# Patient Record
Sex: FEMALE | Race: BLACK OR AFRICAN AMERICAN | NOT HISPANIC OR LATINO | Employment: PART TIME | URBAN - METROPOLITAN AREA
[De-identification: names, ages, dates, MRNs, and addresses within clinical notes are randomized per-mention and may not be internally consistent; named-entity substitution may affect disease eponyms.]

---

## 2017-07-22 ENCOUNTER — HOSPITAL ENCOUNTER (EMERGENCY)
Facility: MEDICAL CENTER | Age: 27
End: 2017-07-22
Attending: EMERGENCY MEDICINE
Payer: MEDICAID

## 2017-07-22 ENCOUNTER — APPOINTMENT (OUTPATIENT)
Dept: RADIOLOGY | Facility: MEDICAL CENTER | Age: 27
End: 2017-07-22
Attending: EMERGENCY MEDICINE
Payer: MEDICAID

## 2017-07-22 VITALS
RESPIRATION RATE: 16 BRPM | DIASTOLIC BLOOD PRESSURE: 65 MMHG | BODY MASS INDEX: 42.52 KG/M2 | HEART RATE: 75 BPM | SYSTOLIC BLOOD PRESSURE: 111 MMHG | WEIGHT: 240 LBS | HEIGHT: 63 IN | OXYGEN SATURATION: 95 % | TEMPERATURE: 99 F

## 2017-07-22 DIAGNOSIS — S20.219A CHEST WALL CONTUSION, UNSPECIFIED LATERALITY, INITIAL ENCOUNTER: ICD-10-CM

## 2017-07-22 DIAGNOSIS — S80.01XA CONTUSION OF RIGHT KNEE, INITIAL ENCOUNTER: ICD-10-CM
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
## 2017-07-22 PROCEDURE — 73560 X-RAY EXAM OF KNEE 1 OR 2: CPT | Mod: RT

## 2017-07-22 PROCEDURE — 99284 EMERGENCY DEPT VISIT MOD MDM: CPT

## 2017-07-22 PROCEDURE — 305948 HCHG GREEN TRAUMA ACT PRE-NOTIFY NO CC

## 2017-07-22 PROCEDURE — 71010 DX-CHEST-LIMITED (1 VIEW): CPT

## 2017-07-22 ASSESSMENT — PAIN SCALES - GENERAL
PAINLEVEL_OUTOF10: 6
PAINLEVEL_OUTOF10: 3

## 2017-07-22 ASSESSMENT — LIFESTYLE VARIABLES: DO YOU DRINK ALCOHOL: NO

## 2017-07-22 NOTE — ED AVS SNAPSHOT
7/22/2017    Carmelita Rendon  [place] [address]    Dear Carmelita:    [facility] wants to ensure your discharge home is safe and you or your loved ones have had all of your questions answered regarding your care after you leave the hospital.    Below is a list of resources and contact information should you have any questions regarding your hospital stay, follow-up instructions, or active medical symptoms.    Questions or Concerns Regarding… Contact   Medical Questions Related to Your Discharge  (7 days a week, 8am-5pm) Contact a Nurse Care Coordinator   [phone]   Medical Questions Not Related to Your Discharge  (24 hours a day / 7 days a week)  Contact the Nurse Health Line   [phone]    Medications or Discharge Instructions Refer to your discharge packet   or contact your [facility] Primary Care Provider   [phone]   Follow-up Appointment(s) Schedule your appointment via Right Relevance   or contact Scheduling [phone]   Billing Review your statement via Right Relevance  or contact Billing [phone]   Medical Records Review your records via Right Relevance   or contact Medical Records [phone]     You may receive a telephone call within two days of discharge. This call is to make certain you understand your discharge instructions and have the opportunity to have any questions answered. You can also easily access your medical information, test results and upcoming appointments via the Right Relevance free online health management tool. You can learn more and sign up at [employer]Box/Right Relevance. For assistance setting up your Right Relevance account, please call [phone].    Once again, we want to ensure your discharge home is safe and that you have a clear understanding of any next steps in your care. If you have any questions or concerns, please do not hesitate to contact us, we are here for you. Thank you for choosing [facility] for your healthcare needs.    Sincerely,    Your [facility] Healthcare Team

## 2017-07-22 NOTE — ED AVS SNAPSHOT
amprice Access Code: [email or web address]  Expires: 8/21/2017  3:03 PM    Your email address is not on file at [facility].  Email Addresses are required for you to sign up for amprice, please contact [phone] to verify your personal information and to provide your email address prior to attempting to register for amprice.    Carmelita Chase [facility] [place] [address]    amprice  A secure, online tool to manage your health information     [facility]’s amprice® is a secure, online tool that connects you to your personalized health information from the privacy of your home -- day or night - making it very easy for you to manage your healthcare. Once the activation process is completed, you can even access your medical information using the amprice sudhakar, which is available for free in the Apple Sudhakar store or Google Play store.     To learn more about amprice, visit [email or web address]    There are two levels of access available (as shown below):   My Chart Features  [facility] Primary Care Doctor [facility]  Specialists [facility]  Urgent  Care Non-[facility] Primary Care Doctor   Email your healthcare team securely and privately 24/7 X X X    Manage appointments: schedule your next appointment; view details of past/upcoming appointments X      Request prescription refills. X      View recent personal medical records, including lab and immunizations X X X X   View health record, including health history, allergies, medications X X X X   Read reports about your outpatient visits, procedures, consult and ER notes X X X X   See your discharge summary, which is a recap of your hospital and/or ER visit that includes your diagnosis, lab results, and care plan X X  X     How to register for [employer]:  Once your e-mail address has been verified, follow the following steps to sign up for amprice.     1. Go to  [email or web address]  2. Click on the Sign Up Now box, which takes you to the New Member Sign Up page. You will  need to provide the following information:  a. Enter your Zeomatrix Access Code exactly as it appears at the top of this page. (You will not need to use this code after you’ve completed the sign-up process. If you do not sign up before the expiration date, you must request a new code.)   b. Enter your date of birth.   c. Enter your home email address.   d. Click Submit, and follow the next screen’s instructions.  3. Create a [employer] ID. This will be your Zeomatrix login ID and cannot be changed, so think of one that is secure and easy to remember.  4. Create a Zeomatrix password. You can change your password at any time.  5. Enter your Password Reset Question and Answer. This can be used at a later time if you forget your password.   6. Enter your e-mail address. This allows you to receive e-mail notifications when new information is available in Zeomatrix.  7. Click Sign Up. You can now view your health information.    For assistance activating your Zeomatrix account, call [phone]
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
## 2017-07-22 NOTE — ED NOTES
Discharge instructions given.  All questions answered.  Pt to follow-up as needed.  Pt verbalized understanding.  All belongings with pt.  Pt ambulated to lobby.
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
## 2020-12-17 ENCOUNTER — GYNECOLOGY VISIT (OUTPATIENT)
Dept: OBGYN | Facility: CLINIC | Age: 30
End: 2020-12-17
Payer: MEDICAID

## 2020-12-17 VITALS
HEIGHT: 64 IN | WEIGHT: 270.4 LBS | DIASTOLIC BLOOD PRESSURE: 80 MMHG | BODY MASS INDEX: 46.16 KG/M2 | SYSTOLIC BLOOD PRESSURE: 120 MMHG

## 2020-12-17 DIAGNOSIS — E28.2 PCOS (POLYCYSTIC OVARIAN SYNDROME): ICD-10-CM

## 2020-12-17 DIAGNOSIS — Z01.419 WELL WOMAN EXAM WITH ROUTINE GYNECOLOGICAL EXAM: Primary | ICD-10-CM

## 2020-12-17 DIAGNOSIS — L83 ACANTHOSIS NIGRICANS: ICD-10-CM

## 2020-12-17 DIAGNOSIS — N89.8 VAGINAL ODOR: ICD-10-CM

## 2020-12-17 DIAGNOSIS — E66.01 CLASS 3 SEVERE OBESITY DUE TO EXCESS CALORIES WITH BODY MASS INDEX (BMI) OF 45.0 TO 49.9 IN ADULT, UNSPECIFIED WHETHER SERIOUS COMORBIDITY PRESENT (HCC): ICD-10-CM

## 2020-12-17 PROCEDURE — 99204 OFFICE O/P NEW MOD 45 MIN: CPT | Performed by: NURSE PRACTITIONER

## 2020-12-17 NOTE — NON-PROVIDER
Patient here c/o odor   LMP= PCOS   BCM: N/a  Last pap date/result: 2017 normal due for one today   Last mammogram if applicable n/a  Phone number: [phone]  Pharmacy confirmed.

## 2020-12-17 NOTE — PROGRESS NOTES
Carmelita Rendon is a 30 y.o. y.o. female who presents for her Gynecologic Exam        HPI Comments: Pt presents for well woman exam. Pt has complaints related to PCOS symptoms, vaginal odor, and infertility. No LMP recorded. (Menstrual status: Irregular Menses).    Carmelita is here to establish care, get a pap smear done, and discuss her fertility.  She is currently sexually active with 1 male partner. She has a history of SAB about 4 years ago, denies complications, and was approx 12 weeks pregnant. Has not had any other pregnancies since then.  Last pap was in 2017 and normal. Has history of abnormal pap necessitating colposcopy, but that came back WNL.  She states she was diagnosed with PCOS a while ago, but doesn't know when or who. She is not sure of how they diagnosed it. Doesn't currently take any medication, and isn't seeing any other doctors for her health maintenance.  She states she hardly ever gets a period. Will get some spotting every now and then. Some PMS symptoms including cramping and headaches with bleeding. Is not currently keeping track.    Review of Systems   Pertinent positives documented in HPI and all other systems reviewed & are negative    All PMH, PSH, allergies, social history and FH reviewed and updated today:  Past Medical History:   Diagnosis Date   • Acid reflux      History reviewed. No pertinent surgical history.  Patient has no known allergies.  Social History     Socioeconomic History   • Marital status: Single     Spouse name: Not on file   • Number of children: Not on file   • Years of education: Not on file   • Highest education level: Not on file   Occupational History   • Not on file   Social Needs   • Financial resource strain: Not on file   • Food insecurity     Worry: Not on file     Inability: Not on file   • Transportation needs     Medical: Not on file     Non-medical: Not on file   Tobacco Use   • Smoking status: Current Every Day Smoker     Packs/day: 0.50     Types:  "Cigarettes   • Smokeless tobacco: Never Used   Substance and Sexual Activity   • Alcohol use: No   • Drug use: No   • Sexual activity: Yes     Partners: Male   Lifestyle   • Physical activity     Days per week: Not on file     Minutes per session: Not on file   • Stress: Not on file   Relationships   • Social connections     Talks on phone: Not on file     Gets together: Not on file     Attends Oriental orthodox service: Not on file     Active member of club or organization: Not on file     Attends meetings of clubs or organizations: Not on file     Relationship status: Not on file   • Intimate partner violence     Fear of current or ex partner: Not on file     Emotionally abused: Not on file     Physically abused: Not on file     Forced sexual activity: Not on file   Other Topics Concern   • Not on file   Social History Narrative   • Not on file     History reviewed. No pertinent family history.  Medications:   No current [facility]-ordered outpatient medications on file.     No current [facility]-ordered facility-administered medications on file.           Objective:   Vital measurements:  /80 (BP Location: Right arm, Patient Position: Sitting, BP Cuff Size: Adult)   Ht 1.626 m (5' 4\")   Wt 122.7 kg (270 lb 6.4 oz)   Body mass index is 46.41 kg/m². (Goal BM I>18 <25)    Physical Exam     Chaperone offered: yes    Nursing note and vitals reviewed.  Constitutional: She is oriented to person, place, and time. She appears well-developed and well-nourished. No distress.     HEENT:   Head: Normocephalic and atraumatic.   Right Ear: External ear normal.   Left Ear: External ear normal.   Nose: Nose normal.   Eyes: Conjunctivae and EOM are normal. Pupils are equal, round, and reactive to light. No scleral icterus.     Neck: Normal range of motion. Neck supple. No tracheal deviation present. No thyromegaly present.     Pulmonary/Chest: Effort normal and breath sounds normal. No respiratory distress. She has no wheezes. She has no " rales. She exhibits no tenderness.     Cardiovascular: Regular, rate and rhythm. No JVD.    Abdominal: Soft. Bowel sounds are normal. She exhibits no distension and no mass. No tenderness. She has no rebound and no guarding.     Breast:  Symmetrical, normal consistency without masses., No dimpling or skin changes, Normal nipples without discharge, no axillary lymphadenopathy, negative, Inspection negative. No nipple discharge or bleeding, No masses    Genitourinary:  Pelvic exam was performed with patient supine.  External genitalia with no abnormal pigmentation, labial fusion,rash, tenderness, lesion or injury to the labia bilaterally.  Vagina is moist with no lesions, foul discharge, erythema, tenderness or bleeding. No foreign body around the vagina or signs of injury.   Cervix exhibits no motion tenderness, no discharge and no friability.   Uterus is mid-position, not deviated, not enlarged, not fixed and not tender.  Right adnexum displays no mass, no tenderness and no fullness. Left adnexum displays no mass, no tenderness and no fullness.     Musculoskeletal: Normal range of motion. She exhibits no edema and no tenderness.     Lymphadenopathy: She has no cervical adenopathy.     Neurological: She is alert and oriented to person, place, and time. She exhibits normal muscle tone.     Skin: Skin is warm and dry. No rash noted. She is not diaphoretic. No erythema. No pallor.     Psychiatric: She has a normal mood and affect. Her behavior is normal. Judgment and thought content normal.      Assessment:     1. Well woman exam with routine gynecological exam  THINPREP PAP W/HPV AND CTNG   2. PCOS (polycystic ovarian syndrome)  TESTOSTERONE F&T FEMALES/CHILD    17-OH PROGESTERONE    PROLACTIN    TSH    FSH    ANTI-MULLERIAN HORMONE([facility])    US-PELVIC TRANSVAGINAL ONLY   3. Vaginal odor  VAGINAL PATHOGENS DNA PANEL   4. Acanthosis nigricans  REFERRAL TO FOLLOW-UP WITH PRIMARY CARE    HEMOGLOBIN A1C   5. Class 3 severe  obesity due to excess calories with body mass index (BMI) of 45.0 to 49.9 in adult, unspecified whether serious comorbidity present (HCC)  REFERRAL TO FOLLOW-UP WITH PRIMARY CARE    REFERRAL TO [facility] IMPROVEMENT PROGRAMS (HIP)    HEMOGLOBIN A1C     Discussed need to get labs and US done prior to next appt. She is to follow up with PCP and HIP as scheduled.   Discussed need to track everything- intercourse, bleeding, symptoms. She is to follow up with positive UPT ASAP, or after all testing is done.    Discussed need to work on weight loss. Discussed that often times, ovulation will return with weight loss of 10%. Encouraged good diet and exercise. Start PNV now. Avoid smoking, alcohol, and drugs.    Plan:   Pap and physical exam performed  Monthly SBE.  Counseling: breast self exam, STD prevention, use and side effects of OCPs and family planning choices  Encourage exercise and proper diet.  Mammograms starting @ age 40 annually.  See medications and orders placed in encounter report.    Follow up after testing is done or sooner PRN  Will contact with abnormal results    Gladys BOLESM, APRN

## 2020-12-18 ENCOUNTER — TELEPHONE (OUTPATIENT)
Dept: SCHEDULING | Facility: IMAGING CENTER | Age: 30
End: 2020-12-18

## 2020-12-21 DIAGNOSIS — N76.0 BV (BACTERIAL VAGINOSIS): Primary | ICD-10-CM

## 2020-12-21 DIAGNOSIS — N89.8 VAGINAL ODOR: ICD-10-CM

## 2020-12-21 DIAGNOSIS — B96.89 BV (BACTERIAL VAGINOSIS): Primary | ICD-10-CM

## 2020-12-21 RX ORDER — METRONIDAZOLE 500 MG/1
500 TABLET ORAL EVERY 12 HOURS
Qty: 14 TAB | Refills: 0 | Status: SHIPPED | OUTPATIENT
Start: 2020-12-21 | End: 2020-12-28

## 2020-12-22 ENCOUNTER — TELEPHONE (OUTPATIENT)
Dept: OBGYN | Facility: CLINIC | Age: 30
End: 2020-12-22

## 2020-12-22 NOTE — TELEPHONE ENCOUNTER
----- Message from MARCOS Graves sent at 12/21/2020 10:34 AM PST -----  +BV - rx sent to pharmacy.      Called pt and informed her test came back positive for BV. Pt informed she needs to start antibiotics. Should take the full Tx and advised to take meds with food and to avoid alcohol while on Tx. Pharmacy verified with pt. Pt agreed and verbalized understanding.

## 2020-12-30 ENCOUNTER — HOSPITAL ENCOUNTER (OUTPATIENT)
Dept: RADIOLOGY | Facility: MEDICAL CENTER | Age: 30
End: 2020-12-30
Attending: NURSE PRACTITIONER
Payer: MEDICAID

## 2020-12-30 DIAGNOSIS — E28.2 PCOS (POLYCYSTIC OVARIAN SYNDROME): ICD-10-CM

## 2020-12-30 PROCEDURE — 76830 TRANSVAGINAL US NON-OB: CPT

## 2020-12-31 DIAGNOSIS — Z01.419 WELL WOMAN EXAM WITH ROUTINE GYNECOLOGICAL EXAM: ICD-10-CM

## 2021-01-12 ENCOUNTER — TELEPHONE (OUTPATIENT)
Dept: OBGYN | Facility: CLINIC | Age: 31
End: 2021-01-12

## 2021-01-12 DIAGNOSIS — E66.01 CLASS 3 SEVERE OBESITY DUE TO EXCESS CALORIES WITH BODY MASS INDEX (BMI) OF 45.0 TO 49.9 IN ADULT, UNSPECIFIED WHETHER SERIOUS COMORBIDITY PRESENT (HCC): ICD-10-CM

## 2021-01-12 DIAGNOSIS — L83 ACANTHOSIS NIGRICANS: ICD-10-CM

## 2021-01-12 NOTE — TELEPHONE ENCOUNTER
----- Message from Gladys Johnston C.N.M. sent at 1/12/2021 10:54 AM PST -----  Follow up with MD's to review and come up with further POC     ----- Message from Gladys Johnston C.N.M. sent at 1/12/2021 10:54 AM PST -----  Please call patient, have her make an appointment with one of the physicians to review US and labs in order to further her POC. Thank you        1/12/2021 1227 Left message for pt to call back regarding US results.   1/14/2021 0910 Left message for pt to call back regarding US results.   1/15/2021 1517 Left message for pt to call back regarding US results.   1/18/2021 pt has not call back. Will send letter today to contact our office.

## 2021-01-12 NOTE — LETTER
January 18, 2021          Dear Carmelita Rendon,      Please call us regarding your care.  One of the nurses is available to speak with you Monday through Friday, 8 a.m. to 4:30 p.m.    Please call us at [phone]; thank you for your prompt attention.        Sincerely,    Windy Merritt R.N.    Electronically Signed

## 2021-01-14 ENCOUNTER — TELEPHONE (OUTPATIENT)
Dept: OBGYN | Facility: CLINIC | Age: 31
End: 2021-01-14

## 2021-01-14 NOTE — TELEPHONE ENCOUNTER
----- Message from Gladys Johnston C.N.M. sent at 1/12/2021 10:54 AM PST -----  Please call patient, have her make an appointment with one of the physicians to review US and labs in order to further her POC. Thank you    Please telephone encounter from 1/12/2021 1/14/2021 4589 Left message for pt to call back regarding lab results.

## 2021-01-28 NOTE — TELEPHONE ENCOUNTER
Pt states was sent a letter to contact our office, informed pt that we were trying to contact her to schedule an appt for the MD's to go over her U/S results and labs, pt verbalized understanding and transferred to Raquel PINTO to schedule appt.

## 2021-04-21 ENCOUNTER — OFFICE VISIT (OUTPATIENT)
Dept: MEDICAL GROUP | Facility: MEDICAL CENTER | Age: 31
End: 2021-04-21
Attending: PHYSICIAN ASSISTANT
Payer: MEDICAID

## 2021-04-21 VITALS
SYSTOLIC BLOOD PRESSURE: 110 MMHG | HEART RATE: 89 BPM | OXYGEN SATURATION: 94 % | WEIGHT: 270.6 LBS | DIASTOLIC BLOOD PRESSURE: 60 MMHG | RESPIRATION RATE: 18 BRPM | TEMPERATURE: 98 F | BODY MASS INDEX: 47.95 KG/M2 | HEIGHT: 63 IN

## 2021-04-21 DIAGNOSIS — Z71.6 ENCOUNTER FOR SMOKING CESSATION COUNSELING: ICD-10-CM

## 2021-04-21 DIAGNOSIS — G56.03 BILATERAL CARPAL TUNNEL SYNDROME: ICD-10-CM

## 2021-04-21 DIAGNOSIS — R06.02 SHORTNESS OF BREATH: ICD-10-CM

## 2021-04-21 DIAGNOSIS — F17.210 TOBACCO SMOKER, 1 PACK OF CIGARETTES OR LESS PER DAY: ICD-10-CM

## 2021-04-21 PROCEDURE — 99213 OFFICE O/P EST LOW 20 MIN: CPT | Performed by: PHYSICIAN ASSISTANT

## 2021-04-21 PROCEDURE — 99204 OFFICE O/P NEW MOD 45 MIN: CPT | Performed by: PHYSICIAN ASSISTANT

## 2021-04-21 PROCEDURE — 99203 OFFICE O/P NEW LOW 30 MIN: CPT | Performed by: PHYSICIAN ASSISTANT

## 2021-04-21 RX ORDER — VARENICLINE TARTRATE 0.5 MG/1
0.5 TABLET, FILM COATED ORAL DAILY
Qty: 11 TABLET | Refills: 0 | Status: SHIPPED | OUTPATIENT
Start: 2021-04-21 | End: 2021-05-03

## 2021-04-21 ASSESSMENT — PATIENT HEALTH QUESTIONNAIRE - PHQ9: CLINICAL INTERPRETATION OF PHQ2 SCORE: 0

## 2021-04-21 NOTE — PROGRESS NOTES
Chief Complaint   Patient presents with   • Establish Care     shortness of breath     Subjective:     HPI:   Carmelita Rendon is a 30 y.o. female here to establish care and to discuss the evaluation and management of:    Encounter for smoking cessation counseling  This is a chronic condition. She is smoking tobacco.  Years used: 10 years.   Packs/day: 1.   Previously quit/prior attempts: 6 prior attempts to stop smoking.   Duration of success: 2 months.    Method used: Has tried patches, gum, vape and chantix.  He has been more successful Chantix.  Triggers/reason for relapse: depression.   Reports dyspnea, coughing or wheezing.  Set quit date: May 5th.  Reports that she does not have a good support system.    Bilateral carpal tunnel syndrome  Carmelita presents today to establish care.  She reports a history of bilateral carpal tunnel syndrome.  She reports increased difficulty with completing tasks due to her symptoms of intermittent numbness, tingling, pain and weakness of the bilateral hands and wrists.  She reports increased difficulty with opening jars and picking up objects.  She finds that most the time she is dropping objects due to decreased .  She has trialed a combination of ibuprofen and Tylenol and wrist splinting which she reports no benefit or decrease in her symptoms.  She has not seen a specialist for this in the past    ROS  See HPI.     No Known Allergies    Current medicines (including changes today)  Current Outpatient Medications   Medication Sig Dispense Refill   • varenicline (CHANTIX) 0.5 MG tablet Take 1 tablet by mouth every day. 11 tablet 0     No current facility-administered medications for this visit.     She  has a past medical history of Acid reflux.  She  has no past surgical history on file.  Social History     Tobacco Use   • Smoking status: Current Every Day Smoker     Packs/day: 1.00     Years: 10.00     Pack years: 10.00     Types: Cigarettes   • Smokeless tobacco: Never  "Used   Substance Use Topics   • Alcohol use: No   • Drug use: No       Family History   Problem Relation Age of Onset   • Diabetes Mother    • Lung Disease Mother         emphysema    • Hypertension Father    • Cancer Neg Hx    • Heart Disease Neg Hx      Family Status   Relation Name Status   • Mo     • Fa     • Bro 2 Alive       Patient Active Problem List    Diagnosis Date Noted   • Encounter for smoking cessation counseling 2021   • Bilateral carpal tunnel syndrome 2021   • BV (bacterial vaginosis) 2020   • Acanthosis nigricans 2020   • PCOS (polycystic ovarian syndrome) 2020   • Class 3 severe obesity due to excess calories with body mass index (BMI) of 45.0 to 49.9 in adult ([facility]) 2020        Objective:     /60 (BP Location: Right arm, Patient Position: Sitting, BP Cuff Size: Adult long)   Pulse 89   Temp 36.7 °C (98 °F) (Temporal)   Resp 18   Ht 1.6 m (5' 3\")   Wt 123 kg (270 lb 9.6 oz)   SpO2 94%  Body mass index is 47.93 kg/m².    Physical Exam:  Physical Exam   Constitutional: She is oriented to person, place, and time and well-developed, well-nourished, and in no distress.   HENT:   Head: Normocephalic.   Right Ear: External ear normal.   Left Ear: External ear normal.   Eyes: Pupils are equal, round, and reactive to light.   Neck: No thyromegaly present.   Acanthosis nigricans of the neck.   Cardiovascular: Normal rate, regular rhythm and normal heart sounds.   Pulmonary/Chest: Effort normal and breath sounds normal.   Musculoskeletal:         General: Normal range of motion.      Cervical back: Normal range of motion.   Lymphadenopathy:     She has no cervical adenopathy.        Right: No supraclavicular adenopathy present.        Left: No supraclavicular adenopathy present.   Neurological: She is alert and oriented to person, place, and time. She has normal sensation and normal strength. Gait normal.   Positive Phalen's test.   Skin: Skin " is warm and dry.   Psychiatric: Affect and judgment normal.   Vitals reviewed.    Assessment and Plan:     The following treatment plan was discussed:    1. Bilateral carpal tunnel syndrome  - This is a chronic worsening condition.  - Motor and sensation are intact.  Positive Phalen's test.  - Plan: Patient has exhausted conservative treatment options and is experiencing worsening of symptoms. REFERRAL TO ORTHOPEDICS for further evaluation and treatment.    2. Encounter for smoking cessation counseling, Shortness of breath  - This is a chronic condition in which the patient is seeking treatment to quit smoking.  - Spent the majority of the time during the visit counseling the patient on smoking cessation.  - Varenicline (CHANTIX) 0.5 MG tablet; Take 1 Tab by mouth everyday, Dispense: 11 Tab; Refill: 0              - Days 1 to 3: 0.5 mg once daily              - Days 4 to 7: 0.5 mg twice daily  - Maintenance (? Day 8): 1 mg twice daily for 11 weeks; may consider a temporary or permanent dose reduction if usual         dose is not tolerated.  - Education given on medication use/regimen and side effect profile.  - We will follow-up closely on Dany within 2 weeks from set quit date and see how she is tolerating the medication and overall tobacco cessation.  - Due to new onset symptoms of dyspnea, cough and wheezing, we will obtain a chest xray and refer to Pulmonology for spirometry testing.   - REFERRAL TO PULMONARY AND SLEEP MEDICINE  - DX-CHEST-2 VIEWS; Future    Any change or worsening of signs or symptoms, patient encouraged to follow-up or report to emergency room for further evaluation. Patient verbalizes understanding and agrees.    Follow-Up: Return if symptoms worsen or fail to improve.      PLEASE NOTE: This dictation was created using voice recognition software. I have made every reasonable attempt to correct obvious errors, but I expect that there are errors of grammar and possibly content that I did not  discover before finalizing the note.

## 2021-04-21 NOTE — ASSESSMENT & PLAN NOTE
This is a chronic condition. She is smoking tobacco.  Years used: 10 years.   Packs/day: 1.   Previously quit/prior attempts: 6 prior attempts to stop smoking.   Duration of success: 2 months.    Method used: Has tried patches, gum, vape and chantix.  He has been more successful Chantix.  Triggers/reason for relapse: depression.   Reports dyspnea, coughing or wheezing.  Set quit date: May 5th.  Reports that she does not have a good support system.

## 2021-04-21 NOTE — PATIENT INSTRUCTIONS
Chantix/varenicline:   Initial:  Days 1 to 3: 0.5 mg once daily (Start 1 week before quit date: 4/28/21).  Days 4 to 7: 0.5 mg twice daily  Maintenance (? Day 8): 1 mg twice daily for 11 weeks; may consider a temporary or permanent dose reduction if usual dose is not tolerated.    Note: Start 1 week before target quit date.  If able to successfully quit smoking at the end of the 12 weeks, may continue for another 12 weeks to help maintain success.    F/u 3-7 days after scheduled quit date and at least monthly for 3 months thereafter.  PI: 1-800-QUIT-NOW for over the phone counseling.

## 2021-04-21 NOTE — ASSESSMENT & PLAN NOTE
Carmelita presents today to establish care.  She reports a history of bilateral carpal tunnel syndrome.  She reports increased difficulty with completing tasks due to her symptoms of intermittent numbness, tingling, pain and weakness of the bilateral hands and wrists.  She reports increased difficulty with opening jars and picking up objects.  She finds that most the time she is dropping objects due to decreased .  She has trialed a combination of ibuprofen and Tylenol and wrist splinting which she reports no benefit or decrease in her symptoms.  She has not seen a specialist for this in the past

## 2021-04-22 ENCOUNTER — GYNECOLOGY VISIT (OUTPATIENT)
Dept: OBGYN | Facility: CLINIC | Age: 31
End: 2021-04-22
Payer: MEDICAID

## 2021-04-22 VITALS
HEIGHT: 63 IN | DIASTOLIC BLOOD PRESSURE: 78 MMHG | WEIGHT: 270 LBS | SYSTOLIC BLOOD PRESSURE: 132 MMHG | BODY MASS INDEX: 47.84 KG/M2

## 2021-04-22 DIAGNOSIS — N85.9 LESION OF ENDOMETRIUM: ICD-10-CM

## 2021-04-22 DIAGNOSIS — N91.2 AMENORRHEA: ICD-10-CM

## 2021-04-22 DIAGNOSIS — N97.0 INFERTILITY ASSOCIATED WITH ANOVULATION: ICD-10-CM

## 2021-04-22 PROCEDURE — 99214 OFFICE O/P EST MOD 30 MIN: CPT | Performed by: OBSTETRICS & GYNECOLOGY

## 2021-04-22 NOTE — PROGRESS NOTES
Chief complaint;    Carmelita Rendon is a 30 y.o.  who presents complaining of amenorrhea patient not had a menstrual cycle in over 1 year.  Patient did conceive about 6 years ago and had a miscarriage  Patient is also having some questions about infertility  The patient had some lab tests performed including testosterone FSH hemoglobin A1c prolactin all labs normal except hemoglobin A1c 7.0  In addition, transvaginal ultrasound shows normal ovaries but small cystic lesions inside the uterine cavity.    Review of systems; denies fever chills abdominal pain, denies chest pain shortness of breath or urinary symptoms  Past medical history-  Past Medical History:   Diagnosis Date   • Acid reflux      Past surgical history-History reviewed. No pertinent surgical history.  Allergies-Patient has no known allergies.  Medications-  Current Outpatient Medications on File Prior to Visit   Medication Sig Dispense Refill   • varenicline (CHANTIX) 0.5 MG tablet Take 1 tablet by mouth every day. 11 tablet 0     No current facility-administered medications on file prior to visit.     Social history-  Social History     Socioeconomic History   • Marital status: Single     Spouse name: Not on file   • Number of children: Not on file   • Years of education: Not on file   • Highest education level: Not on file   Occupational History   • Not on file   Tobacco Use   • Smoking status: Current Every Day Smoker     Packs/day: 1.00     Years: 10.00     Pack years: 10.00     Types: Cigarettes   • Smokeless tobacco: Never Used   Substance and Sexual Activity   • Alcohol use: No   • Drug use: No   • Sexual activity: Yes     Partners: Male     Birth control/protection: None   Other Topics Concern   • Not on file   Social History Narrative   • Not on file     Social Determinants of Health     Financial Resource Strain:    • Difficulty of Paying Living Expenses:    Food Insecurity:    • Worried About Running Out of Food in the Last Year:   "  • Ran Out of Food in the Last Year:    Transportation Needs:    • Lack of Transportation (Medical):    • Lack of Transportation (Non-Medical):    Physical Activity:    • Days of Exercise per Week:    • Minutes of Exercise per Session:    Stress:    • Feeling of Stress :    Social Connections:    • Frequency of Communication with Friends and Family:    • Frequency of Social Gatherings with Friends and Family:    • Attends Scientology Services:    • Active Member of Clubs or Organizations:    • Attends Club or Organization Meetings:    • Marital Status:    Intimate Partner Violence:    • Fear of Current or Ex-Partner:    • Emotionally Abused:    • Physically Abused:    • Sexually Abused:      Past Family History-no history of breast or ovarian cancer    Physical examination;  Alert and oriented x3  General a thin well-developed well-nourished female in no apparent distress  Vitals:    04/22/21 1313   BP: 132/78   BP Location: Left arm   Patient Position: Sitting   BP Cuff Size: Adult   Weight: 122 kg (270 lb)   Height: 1.6 m (5' 3\")     Skin is warm and dry  Neck-supple  HEENT-head-atraumatic, normocephalic, EOMI, PERRLA  Cardiovascular-regular rate and rhythm, normal S1-S2, no murmurs or gallops  Lungs-clear to auscultation bilaterally  Back-negative CVA tenderness  Abdomen-nondistended positive bowel sounds soft nontender no masses or hepatosplenomegaly  Pelvic examination;  External genitalia-no visible lesions   Vagina-no blood or discharge  Cervix-no gross lesions  Uterus-normal size and shape, nontender  Adnexa without mass or tenderness  Extremities without cyanosis clubbing or edema  Neurologic exam grossly intact    Impression;  Amenorrhea-secondary to elevated BMI  Infertility-secondary to elevated BMI with oligo ovulation  Cystic endometrial lesion-May be endometrial hyperplasia  Diabetes mellitus-type II    Plan;  Needs endometrial biopsy-scheduled 6 May  If biopsy normal will either use OCPs or Provera to " induce menstrual cycles-this patient is hypoestrogenic due to elevated BMI with conversion of androstenedione to estrone and a peripheral adipose tissue.  Discussed weight loss    25  Minutes spent with the patient in face-to-face contact, greater than 50% of the time spent on counseling and coordination of care. All questions answered in detail.    Female chaperone present for entire examination and history

## 2021-04-22 NOTE — PROGRESS NOTES
Patient here for a GYN follow up.   Last seen on 12/17/2020 by Gladys Johnston  C/o u/s results today   pharmacy verified.  Patient phone #:

## 2021-05-03 DIAGNOSIS — N76.0 BV (BACTERIAL VAGINOSIS): ICD-10-CM

## 2021-05-03 DIAGNOSIS — Z71.6 ENCOUNTER FOR SMOKING CESSATION COUNSELING: ICD-10-CM

## 2021-05-03 DIAGNOSIS — B96.89 BV (BACTERIAL VAGINOSIS): ICD-10-CM

## 2021-05-03 RX ORDER — VARENICLINE TARTRATE 1 MG/1
1 TABLET, FILM COATED ORAL 2 TIMES DAILY
Qty: 30 TABLET | Refills: 5 | Status: SHIPPED | OUTPATIENT
Start: 2021-05-03 | End: 2023-01-23

## 2021-05-06 ENCOUNTER — GYNECOLOGY VISIT (OUTPATIENT)
Dept: OBGYN | Facility: CLINIC | Age: 31
End: 2021-05-06
Payer: MEDICAID

## 2021-05-06 DIAGNOSIS — N93.8 DUB (DYSFUNCTIONAL UTERINE BLEEDING): Primary | ICD-10-CM

## 2021-05-06 LAB
INT CON NEG: NORMAL
INT CON POS: NORMAL
POC URINE PREGNANCY TEST: NEGATIVE

## 2021-05-06 PROCEDURE — 81025 URINE PREGNANCY TEST: CPT | Performed by: OBSTETRICS & GYNECOLOGY

## 2021-05-06 PROCEDURE — 58100 BIOPSY OF UTERUS LINING: CPT | Performed by: OBSTETRICS & GYNECOLOGY

## 2021-05-06 NOTE — PROCEDURES
Procedure note; Pipelle endometrial biopsy    Indications; cystic-appearing lesion in the endometrial cavity seen on transvaginal ultrasound    Informed consent obtained, consent signed    Pregnancy test negative    Bimanual exam reveals; axial uterus.  Normal size    Vaginal speculum placed    Betadine prep the cervix and vagina    Single-toothed tenaculum placed on the anterior lip of the cervix    Pipelle introduced without difficulty    Moderate tissue withdrawn    The patient tolerated the procedure well    Tissue sent to pathology    Patient has appointment with me on 22 June to review results of test and formulate treatment plan

## 2021-05-19 DIAGNOSIS — N93.8 DUB (DYSFUNCTIONAL UTERINE BLEEDING): ICD-10-CM

## 2021-05-27 ENCOUNTER — TELEPHONE (OUTPATIENT)
Dept: MEDICAL GROUP | Facility: MEDICAL CENTER | Age: 31
End: 2021-05-27

## 2021-05-27 NOTE — TELEPHONE ENCOUNTER
DOCUMENTATION OF PAR STATUS:    1. Name of Medication & Dose: chantix 1mg      2. Name of Prescription Coverage Company & phone #: medicaid     3. Date Prior Auth Submitted: 5/27/2021    4. What information was given to obtain insurance decision? icd-10 code     5. Prior Auth Status? Pending    6. Patient Notified: N\A

## 2021-06-01 ENCOUNTER — TELEPHONE (OUTPATIENT)
Dept: MEDICAL GROUP | Facility: MEDICAL CENTER | Age: 31
End: 2021-06-01

## 2021-06-02 NOTE — TELEPHONE ENCOUNTER
FINAL PRIOR AUTHORIZATION STATUS:    1.  Name of Medication & Dose:  varenicline (CHANTIX) 1 MG tablet       2. Prior Auth Status: Denied.  Reason: cancelled, pharmacy do not manage benefits for this plan    3. Action Taken: Pharmacy Notified: yes Patient Notified: yes

## 2021-07-06 ENCOUNTER — TELEPHONE (OUTPATIENT)
Dept: MEDICAL GROUP | Facility: MEDICAL CENTER | Age: 31
End: 2021-07-06

## 2021-07-07 NOTE — TELEPHONE ENCOUNTER
FINAL PRIOR AUTHORIZATION STATUS:    1.  Name of Medication & Dose: chantix     2. Prior Auth Status: Denied.  Reason: patient needs to have tried and failed both of the following= nicotine replacement therapy(gum,lozenge, patch) as well as Bupropion,     Please see scanned prior auth response for complete details.

## 2022-01-08 ENCOUNTER — HOSPITAL ENCOUNTER (EMERGENCY)
Facility: MEDICAL CENTER | Age: 32
End: 2022-01-08
Payer: MEDICAID

## 2022-01-08 VITALS
SYSTOLIC BLOOD PRESSURE: 140 MMHG | WEIGHT: 282.19 LBS | TEMPERATURE: 98.3 F | BODY MASS INDEX: 50 KG/M2 | DIASTOLIC BLOOD PRESSURE: 97 MMHG | RESPIRATION RATE: 18 BRPM | HEIGHT: 63 IN | HEART RATE: 70 BPM | OXYGEN SATURATION: 95 %

## 2022-01-08 LAB — EKG IMPRESSION: NORMAL

## 2022-01-08 PROCEDURE — 93005 ELECTROCARDIOGRAM TRACING: CPT

## 2022-01-08 PROCEDURE — 302449 STATCHG TRIAGE ONLY (STATISTIC)

## 2022-01-09 NOTE — ED TRIAGE NOTES
Chief Complaint   Patient presents with   • Shortness of Breath     x 1 hour     Pt ambulatory to triage for above complaint. Pt states she was cleaning her house about and hour ago and suddenly stare td feeling SOB. Denies any other symptoms. Has two doses COIVD vaccine. Pt states she is feeling better. EKG in triage.     Pt is alert/oriented and follows commands. Pt speaking in full sentences and responds appropriately to questions. No acute distress noted in triage and respirations are even and unlabored.     Pt placed in lobby and educated on triage process. Pt encouraged to alert staff for any changes in condition.

## 2022-10-20 ENCOUNTER — GYNECOLOGY VISIT (OUTPATIENT)
Dept: OBGYN | Facility: CLINIC | Age: 32
End: 2022-10-20
Payer: MEDICAID

## 2022-10-20 VITALS
SYSTOLIC BLOOD PRESSURE: 120 MMHG | WEIGHT: 268 LBS | DIASTOLIC BLOOD PRESSURE: 84 MMHG | HEIGHT: 63 IN | BODY MASS INDEX: 47.48 KG/M2

## 2022-10-20 DIAGNOSIS — N76.0 BV (BACTERIAL VAGINOSIS): ICD-10-CM

## 2022-10-20 DIAGNOSIS — B96.89 BV (BACTERIAL VAGINOSIS): ICD-10-CM

## 2022-10-20 DIAGNOSIS — N89.8 VAGINAL DISCHARGE: ICD-10-CM

## 2022-10-20 LAB
APPEARANCE UR: NORMAL
BILIRUB UR STRIP-MCNC: NORMAL MG/DL
COLOR UR AUTO: NORMAL
GLUCOSE UR STRIP.AUTO-MCNC: NEGATIVE MG/DL
INT CON NEG: NEGATIVE
INT CON POS: POSITIVE
KETONES UR STRIP.AUTO-MCNC: NEGATIVE MG/DL
LEUKOCYTE ESTERASE UR QL STRIP.AUTO: NEGATIVE
NITRITE UR QL STRIP.AUTO: NEGATIVE
PH UR STRIP.AUTO: 6 [PH] (ref 5–8)
POC URINE PREGNANCY TEST: NEGATIVE
PROT UR QL STRIP: NORMAL MG/DL
RBC UR QL AUTO: NEGATIVE
SP GR UR STRIP.AUTO: 1.02
UROBILINOGEN UR STRIP-MCNC: NORMAL MG/DL

## 2022-10-20 PROCEDURE — 81025 URINE PREGNANCY TEST: CPT

## 2022-10-20 PROCEDURE — 99213 OFFICE O/P EST LOW 20 MIN: CPT

## 2022-10-20 PROCEDURE — 81002 URINALYSIS NONAUTO W/O SCOPE: CPT

## 2022-10-20 RX ORDER — METRONIDAZOLE 500 MG/1
500 TABLET ORAL 2 TIMES DAILY
Qty: 14 TABLET | Refills: 0 | Status: SHIPPED | OUTPATIENT
Start: 2022-10-20 | End: 2023-01-23

## 2022-10-20 NOTE — PROGRESS NOTES
Patient here for GYN exam.  C/o vaginal itching, greenish/brownish vag discharge with odor  LMP= 8/25/22  BCM: none  Last pap:12/17/2020=negative  Last mammogram if applies: n/a  Phone number: [phone]  Pharmacy verified

## 2022-10-20 NOTE — PROGRESS NOTES
"Thomas Mae is a 32 y.o.  who presents to clinic today for vaginal discharge, odor, itching, discomfort.       Objective     The patient appears well, alert and oriented x 3, in no acute distress.  Vitals:    10/20/22 1348   BP: 120/84   Weight: 268 lb   Height: 5' 3\"     Abdomen: Soft without tenderness, guarding mass or organomegaly.  Extremities: No edema, pulses equal  Neurological: Normal No focal signs  Pelvic: External genitalia,urethral meatus, urethra, bladder and vagina normal. Cervix visualized and normal. Anus and perineum normal. Positive findings: some clear vaginal discharge noted.   Pap: Up to date - NILM 2020  Microscopy with >20% clue cells, +whiff    Assessment/Plan:     1. Vaginal discharge  POCT Pregnancy    POCT Urinalysis    VAGINAL PATHOGENS DNA PANEL    Chlamydia/GC, PCR (Genital/Anal swab)      2. BV (bacterial vaginosis)          Orders Placed This Encounter    VAGINAL PATHOGENS DNA PANEL    Chlamydia/GC, PCR (Genital/Anal swab)    POCT Pregnancy    POCT Urinalysis    metroNIDAZOLE (FLAGYL) 500 MG Tab       Education reviewed: we reviewed birth control, STI prevention, and safe sex.     The patient will be contacted with results or can access via [facility] My Chart    Amanda Ramirez C.N.M.   "

## 2022-10-24 DIAGNOSIS — N89.8 VAGINAL DISCHARGE: ICD-10-CM

## 2023-01-05 ENCOUNTER — GYNECOLOGY VISIT (OUTPATIENT)
Dept: OBGYN | Facility: CLINIC | Age: 33
End: 2023-01-05
Payer: MEDICAID

## 2023-01-05 VITALS
SYSTOLIC BLOOD PRESSURE: 126 MMHG | DIASTOLIC BLOOD PRESSURE: 84 MMHG | HEIGHT: 64 IN | BODY MASS INDEX: 46.95 KG/M2 | WEIGHT: 275 LBS

## 2023-01-05 DIAGNOSIS — Z32.02 PREGNANCY EXAMINATION OR TEST, NEGATIVE RESULT: ICD-10-CM

## 2023-01-05 LAB
INT CON NEG: NEGATIVE
INT CON POS: POSITIVE
POC URINE PREGNANCY TEST: NEGATIVE

## 2023-01-05 PROCEDURE — G0101 CA SCREEN;PELVIC/BREAST EXAM: HCPCS | Performed by: NURSE PRACTITIONER

## 2023-01-05 PROCEDURE — 81025 URINE PREGNANCY TEST: CPT | Performed by: NURSE PRACTITIONER

## 2023-01-05 NOTE — PROGRESS NOTES
HPI Comments: Carmelita Rendon is a 32 y.o. y.o. female who presents for her Gynecologic Exam.       Pt reports no period in 3 months. Also has pimple on underside of RT breast.   No LMP recorded (lmp unknown). (Menstrual status: Irregular Menses).  Pt is sexually active with one male partner  She is currently currently not on BCM, has been actively trying to conceive for several years  .  ROS: Patient is feeling well. No dyspnea or chest pain on exertion. No Abdominal pain, change in bowel habits, black or bloody stools. No urinary sx. GYN ROS:See above   No neurological complaints.  Pertinent positives documented in HPI and all other systems reviewed & are negative    All PMH, PSH, allergies, social history and FH reviewed and updated today:  Past Medical History:   Diagnosis Date    Acid reflux     PCOS (polycystic ovarian syndrome)     Dx'd at age 23     No past surgical history on file.  Patient has no known allergies.  Social History     Socioeconomic History    Marital status: Single   Tobacco Use    Smoking status: Every Day     Packs/day: 0.50     Years: 10.00     Pack years: 5.00     Types: Cigarettes    Smokeless tobacco: Never   Vaping Use    Vaping Use: Never used   Substance and Sexual Activity    Alcohol use: No    Drug use: No    Sexual activity: Yes     Partners: Male     Birth control/protection: None     Family History   Problem Relation Age of Onset    Diabetes Mother     Lung Disease Mother         emphysema     Hypertension Father     Cancer Neg Hx     Heart Disease Neg Hx      Medications:   Current Outpatient Medications Ordered in Epic   Medication Sig Dispense Refill    metroNIDAZOLE (FLAGYL) 500 MG Tab Take 1 Tablet by mouth 2 times a day. (Patient not taking: Reported on 1/5/2023) 14 Tablet 0    varenicline (CHANTIX) 1 MG tablet Take 1 tablet by mouth 2 times a day. (Patient not taking: Reported on 10/20/2022) 30 tablet 5     No current Epic-ordered facility-administered medications on  "file.          Objective:   Vital measurements:  /84 (BP Location: Right arm, Patient Position: Sitting, BP Cuff Size: Adult)   Ht 5' 4\"   Wt 275 lb   Body mass index is 47.2 kg/m². (Goal BM I>18 <25)    Physical Exam   Nursing note and vitals reviewed.  Constitutional: She is oriented to person, place, and time. She appears well-developed and well-nourished. No distress.     HEENT:   Head: Normocephalic and atraumatic.   Right Ear: External ear normal.   Left Ear: External ear normal.   Nose: Nose normal.   Eyes: Conjunctivae and EOM are normal. Pupils are equal, round, and reactive to light. No scleral icterus.     Neck: Normal range of motion. Neck supple. No tracheal deviation present. No thyromegaly present.     Pulmonary/Chest: Effort normal and breath sounds normal. No respiratory distress. She has no wheezes. She has no rales. She exhibits no tenderness.     Cardiovascular: Regular, rate and rhythm. No JVD.    Abdominal: Soft. Bowel sounds are normal. She exhibits no distension and no mass. No tenderness. She has no rebound and no guarding.     Breast:  positive ingrown hair located right lower mid quadrant    Genitourinary:  Pelvic exam was performed with patient supine.  External genitalia with no abnormal pigmentation, labial fusion,rash, tenderness, lesion or injury to the labia bilaterally.  Vagina is moist with no lesions, foul discharge, erythema, tenderness or bleeding. No foreign body around the vagina or signs of injury.   Cervix exhibits no motion tenderness, no discharge and no friability.   Uterus is midline not deviated, not enlarged, not fixed and not tender.  Right adnexum displays no mass, no tenderness and no fullness. Left adnexum displays no mass, no tenderness and no fullness.     Musculoskeletal: Normal range of motion. She exhibits no edema and no tenderness.     Lymphadenopathy: She has no cervical adenopathy.     Neurological: She is alert and oriented to person, place, and " time. She exhibits normal muscle tone.     Skin: Skin is warm and dry. No rash noted. She is not diaphoretic. No erythema. No pallor.     Psychiatric: She has a normal mood and affect. Her behavior is normal. Judgment and thought content normal.        Assessment:     1. Pregnancy examination or test, negative result  THINPREP PAP W/HPV AND CTNG    POCT Pregnancy    HEMOGLOBIN A1C    GLUCOSE TOLERANCE 2 HOUR    TSH+FREE T4          Assessment:  well woman      Plan:   Pap and physical exam performed  Counseling: breast self exam and family planning choices. Pt is diagnosed w PCOS. She is trying to conceive. We discussed improving chances of conception with weight loss of 10-15% of body weight. Pt has appt with PCP on 25th to discuss wt loss/bariatric surgery. We also discuss probability of diabetes as her A1c of 7.0 one year ago. We discuss confirmation by  2 hr glucose testing. We also discussed TSH testing as this can be a factor in infertility.   Encourage exercise and proper diet.  Neosporin to ingrowth hair, hot compresses, open to air.   Pt has Dany, will accept correspondence by that route.   Labs: A1c, 2 hr GTT, TSH/free T4, pap/GCCT

## 2023-01-05 NOTE — PROGRESS NOTES
GYN visit for annual/pap  LMP: Pt states has not had a period in 3 months. States she is trying to conceived  WT: 275.0 lb  BP: 126/84  Pt states she feels irritation and dryness in her vagina, in addition pt reports spotting after intercourse.   Good # [phone]    Negative UPT today, done in clinic

## 2023-01-13 DIAGNOSIS — Z32.02 PREGNANCY EXAMINATION OR TEST, NEGATIVE RESULT: ICD-10-CM

## 2023-01-13 DIAGNOSIS — R87.610 ASCUS WITH POSITIVE HIGH RISK HPV CERVICAL: ICD-10-CM

## 2023-01-13 DIAGNOSIS — R87.810 ASCUS WITH POSITIVE HIGH RISK HPV CERVICAL: ICD-10-CM

## 2023-01-17 ENCOUNTER — TELEPHONE (OUTPATIENT)
Dept: OBGYN | Facility: CLINIC | Age: 33
End: 2023-01-17
Payer: MEDICAID

## 2023-01-17 NOTE — TELEPHONE ENCOUNTER
----- Message from MARCOS Smith sent at 1/13/2023  6:53 PM PST -----  ASCUS with positive HPV, please advise pt that I have sent in a GYN referral for colposcopy        01/17/23  1525 Left message for pt to call back regarding pap results.   01/18/23  1128 pt called back and Pt notified of abnormal pap and needs colposcopy. Procedure explained to pt in detail. Pt stated she had an abnormal pap before and she had this done. All questions answered. Pt agreed and verbalized understanding. Pt transferred to [place] [facility] to schedule Colpo appt with MD.

## 2023-01-23 ENCOUNTER — OFFICE VISIT (OUTPATIENT)
Dept: MEDICAL GROUP | Facility: CLINIC | Age: 33
End: 2023-01-23
Payer: MEDICAID

## 2023-01-23 DIAGNOSIS — Z71.6 ENCOUNTER FOR TOBACCO USE CESSATION COUNSELING: ICD-10-CM

## 2023-01-23 DIAGNOSIS — R06.83 SNORING: ICD-10-CM

## 2023-01-23 DIAGNOSIS — Z13.79 ENCOUNTER FOR GENETIC SCREENING: ICD-10-CM

## 2023-01-23 DIAGNOSIS — E66.01 MORBID OBESITY WITH BMI OF 45.0-49.9, ADULT (HCC): ICD-10-CM

## 2023-01-23 DIAGNOSIS — R45.89 DEPRESSED MOOD: ICD-10-CM

## 2023-01-23 DIAGNOSIS — R06.02 SHORTNESS OF BREATH: ICD-10-CM

## 2023-01-23 PROCEDURE — 99204 OFFICE O/P NEW MOD 45 MIN: CPT | Mod: GC | Performed by: STUDENT IN AN ORGANIZED HEALTH CARE EDUCATION/TRAINING PROGRAM

## 2023-01-23 RX ORDER — VARENICLINE TARTRATE 1 MG/1
0.5 TABLET, FILM COATED ORAL 2 TIMES DAILY
Qty: 60 TABLET | Refills: 3 | Status: SHIPPED | OUTPATIENT
Start: 2023-01-23

## 2023-01-23 RX ORDER — VARENICLINE TARTRATE
KIT
Status: CANCELLED | OUTPATIENT
Start: 2023-01-23

## 2023-01-23 RX ORDER — ALBUTEROL SULFATE 90 UG/1
2 AEROSOL, METERED RESPIRATORY (INHALATION) EVERY 4 HOURS PRN
Qty: 1 EACH | Refills: 1 | Status: SHIPPED | OUTPATIENT
Start: 2023-01-23

## 2023-01-23 NOTE — PROGRESS NOTES
"Subjective:     CC: Establish care    HPI:   Carmelita presents today with      Nocturnal shortness of breath  Patient reports chronic nocturnal shortness of breath  She also experiences shortness of breath during the day  Symptoms have been ongoing for approximately 1 year  No known alleviating or exacerbating factors  She does use chronic tobacco  No known history of asthma or COPD  No chest pain, dyspnea with exertion, pleuritic chest pain or hemoptysis  She also reports snoring at nighttime    Obesity  Patient has tried to lose weight by following keto diet  She is interested in being evaluated for bariatric surgery    Tobacco cessation  Currently using half pack per day  Has quit in the past  Had success with use of Chantix  Experienced some nausea with use of Chantix otherwise tolerated well  She is also tried menthol patches and nicotine replacement    PMH: PCOS and HPV managed by OB/GYN  Medications: None  Allergies: No known drug allergies  Family history: Father hypertension, mother diabetes    GYN history: . Menses: Irregular menses.  Previously controlled with over-the-counter supplements.  LMP: Currently on menstrual period.  Birth control: None, currently planning pregnancy    Social: Lives in apartment with boyfriend.  Not currently employed.  No drugs or alcohol use.  Tobacco use: Half pack per day, currently trying to quit tobacco use.      ROS: See HPI.     Objective:     Exam:  BP (P) 122/88 (BP Location: Right arm, Patient Position: Sitting, BP Cuff Size: Large adult)   Pulse (P) 88   Temp (P) 36.2 °C (97.2 °F) (Temporal)   Ht (P) 1.626 m (5' 4\")   Wt (!) (P) 127 kg (280 lb)   LMP  (LMP Unknown)   SpO2 (P) 98%   BMI (P) 48.06 kg/m²  Body mass index is 48.06 kg/m² (pended).    Physical Exam:  General: Pt resting in NAD, cooperative   Skin:  No cyanosis or jaundice   HEENT: NC/AT. EOMI. No conjunctival injection or sclera icterus.   Lungs:  CTAB, good air movement. Non-labored. "   Cardiovascular:  S1/S2 RRR   Extremities:  No lower extremity edema   CNS:  No gross focal neurologic deficits  Psych: Appropriate mood and affect       Assessment & Plan:     32 y.o. female with the following -     1. Morbid obesity with BMI of 45.0-49.9, adult (HCC)  Chronic.  Recommend healthy diet and exercise.  Will provide referral to bariatric surgery.    2. Snoring  Chronic.  Patient has elevated BMI and snoring concerning for obstructive sleep apnea.  Will provide referral to sleep medicine for sleep study.  - Referral to Pulmonary and Sleep Medicine    3. Shortness of breath  Chronic.  Patient has chronic shortness of breath.  Vital signs stable.  Lungs clear to auscultation bilaterally.  Likely multifactorial secondary to obesity hypoventilation and undiagnosed asthma/COPD.  Will provide prescription for albuterol to use as needed.  In addition we will check spirometry pre and postbronchodilator.  -Albuterol as needed  -Spirometry pre and postbronchodilator  -Follow-up 2 to 4 weeks      4. Encounter for tobacco use cessation counseling  Counseled patient on smoking cessation   Discussed strategies for smoking cessation including:   -Setting date to quit   -Removal of tobacco products   -Total abstinence   -Discussed triggers   -Discussed resources including free telephone lines  1-800-QUIT-NOW    Behavioral health referral   Pharmacology:   Vareniclin .5mg/day X3 days, .5mg BID for 4 days, then 1mg BID   -Follow up 2-4 weeks       5. Depressed mood  Chronic.  Patient mentioned experiencing depressed mood at end of office visit.  Denies SI. She is interested in seeing a therapist.  Recommend following up in clinic with Dr. Art.  In addition we will provide referral to behavioral therapist.    6.  Encounter for genetic screening  Referral to genetics

## 2023-02-01 ENCOUNTER — GYNECOLOGY VISIT (OUTPATIENT)
Dept: OBGYN | Facility: CLINIC | Age: 33
End: 2023-02-01
Payer: MEDICAID

## 2023-02-01 VITALS — DIASTOLIC BLOOD PRESSURE: 80 MMHG | BODY MASS INDEX: 44.63 KG/M2 | WEIGHT: 260 LBS | SYSTOLIC BLOOD PRESSURE: 130 MMHG

## 2023-02-01 DIAGNOSIS — R87.610 ASCUS WITH POSITIVE HIGH RISK HPV CERVICAL: ICD-10-CM

## 2023-02-01 DIAGNOSIS — N89.8 VAGINAL DISCHARGE: ICD-10-CM

## 2023-02-01 DIAGNOSIS — R87.810 ASCUS WITH POSITIVE HIGH RISK HPV CERVICAL: ICD-10-CM

## 2023-02-01 PROCEDURE — 57456 ENDOCERV CURETTAGE W/SCOPE: CPT | Performed by: OBSTETRICS & GYNECOLOGY

## 2023-02-01 RX ORDER — METRONIDAZOLE 7.5 MG/G
1 GEL VAGINAL
Qty: 30 G | Refills: 1 | Status: SHIPPED | OUTPATIENT
Start: 2023-02-01 | End: 2023-02-06

## 2023-02-01 NOTE — PROGRESS NOTES
Carmelita Rendon is a 32 y.o.  female who presents for colposcopy due to abnormal Pap ASCUS positive HPV mRNA E67      HPI: Patient had an abnormal Pap smear years ago and a colposcopy and did not need a cone or a LEEP.    Review of Systems:   Pertinent positives documented in HPI and all other systems reviewed & are negative.     All PMH, PSH, allergies, social history and FH reviewed and updated today:  Past Medical History:   Diagnosis Date    Acid reflux     PCOS (polycystic ovarian syndrome)     Dx'd at age 23       No past surgical history on file.      Current Outpatient Medications:     metroNIDAZOLE, 1 Applicator, Vaginal, QHS    albuterol, 2 Puff, Inhalation, Q4HRS PRN    varenicline, 0.5 mg, Oral, BID    Patient has no known allergies.    Social History     Socioeconomic History    Marital status: Single   Tobacco Use    Smoking status: Every Day     Packs/day: 0.50     Years: 10.00     Pack years: 5.00     Types: Cigarettes    Smokeless tobacco: Never   Vaping Use    Vaping Use: Never used   Substance and Sexual Activity    Alcohol use: No    Drug use: No    Sexual activity: Yes     Partners: Male     Birth control/protection: None       Family History   Problem Relation Age of Onset    Diabetes Mother     Lung Disease Mother         emphysema     Hypertension Father     Cancer Neg Hx     Heart Disease Neg Hx           Objective:   Vital measurements:  /80 (BP Location: Right arm, Patient Position: Sitting)   Wt 260 lb   Body mass index is 44.63 kg/m² (pended). (Goal BM I>18 <25)    Physical Exam:Physical Exam  Constitutional:       Appearance: Normal appearance.   Eyes:      Extraocular Movements: Extraocular movements intact.      Pupils: Pupils are equal, round, and reactive to light.   Pulmonary:      Effort: Pulmonary effort is normal.   Genitourinary:     Comments: PROCEDURE  _________________    After informed consent was signed and procedure explained to the patient in addition to  her abnormal Pap smear a speculum was placed.  As an aside patient voiced her concern that her discharge may have an odor to it as she has been fighting a bacterial infection on and off for quite some time.  Educated patient on the difference between chronic versus recurrent BV and we will test for her today and if positive treat.  Consideration of retesting after treatment to see if it is chronic versus recurrent.  Speculum was placed in acetic acid placed on the cervix with copious amounts.  After waiting at least 2 minutes colposcope was introduced with good visualization of the ectocervix.  Transition zone was also seen.  There was acetowhite changes and scant punctation consistent with mild dysplasia from 1:00 to 3:00 and a second lesion at about 730 on the ectocervix that transitions to abut against the transition zone.  Colposcopy was satisfactory and consistent with Pap smear.  As a predominance of ASCUS HPV Paps are mild dysplasia.  Biopsies were deferred due to satisfactory colpo however an ECC was performed.  Neurological:      Mental Status: She is alert.        Assessment:     1. ASCUS with positive high risk HPV cervical  - Consent for all Surgical, Special Diagnostic or Therapeutic Procedures  - POCT Pregnancy  - Pathology Specimen    2. Vaginal discharge  - Chlamydia/GC, PCR (Genital/Anal swab); Future  - VAGINAL PATHOGENS DNA PANEL; Future  - metroNIDAZOLE (METROGEL-VAGINAL) 0.75 % Gel; Insert 1 Applicator into the vagina at bedtime for 5 days.  Dispense: 30 g; Refill: 1        Plan:     Colpo and ECC done today  Cultures done for evaluation of chronic versus recurrent BV  Rx for vaginal cream to be sent for BV should it be positive

## 2023-02-03 DIAGNOSIS — N89.8 VAGINAL DISCHARGE: ICD-10-CM

## 2023-02-06 ENCOUNTER — TELEMEDICINE (OUTPATIENT)
Dept: MEDICAL GROUP | Facility: CLINIC | Age: 33
End: 2023-02-06
Payer: MEDICAID

## 2023-02-06 DIAGNOSIS — R45.89 DEPRESSED MOOD: ICD-10-CM

## 2023-02-06 PROCEDURE — 90834 PSYTX W PT 45 MINUTES: CPT | Performed by: PSYCHOLOGIST

## 2023-11-03 ENCOUNTER — APPOINTMENT (OUTPATIENT)
Dept: RADIOLOGY | Facility: MEDICAL CENTER | Age: 33
End: 2023-11-03
Attending: STUDENT IN AN ORGANIZED HEALTH CARE EDUCATION/TRAINING PROGRAM

## 2023-11-03 ENCOUNTER — HOSPITAL ENCOUNTER (EMERGENCY)
Facility: MEDICAL CENTER | Age: 33
End: 2023-11-03
Attending: STUDENT IN AN ORGANIZED HEALTH CARE EDUCATION/TRAINING PROGRAM

## 2023-11-03 VITALS
TEMPERATURE: 98.6 F | BODY MASS INDEX: 44.64 KG/M2 | SYSTOLIC BLOOD PRESSURE: 125 MMHG | HEART RATE: 80 BPM | DIASTOLIC BLOOD PRESSURE: 69 MMHG | WEIGHT: 261.47 LBS | RESPIRATION RATE: 16 BRPM | HEIGHT: 64 IN | OXYGEN SATURATION: 98 %

## 2023-11-03 DIAGNOSIS — S82.61XA CLOSED AVULSION FRACTURE OF LATERAL MALLEOLUS OF RIGHT FIBULA, INITIAL ENCOUNTER: ICD-10-CM

## 2023-11-03 PROCEDURE — A9270 NON-COVERED ITEM OR SERVICE: HCPCS | Mod: UD | Performed by: STUDENT IN AN ORGANIZED HEALTH CARE EDUCATION/TRAINING PROGRAM

## 2023-11-03 PROCEDURE — 99284 EMERGENCY DEPT VISIT MOD MDM: CPT

## 2023-11-03 PROCEDURE — 700102 HCHG RX REV CODE 250 W/ 637 OVERRIDE(OP): Mod: UD | Performed by: STUDENT IN AN ORGANIZED HEALTH CARE EDUCATION/TRAINING PROGRAM

## 2023-11-03 PROCEDURE — 73610 X-RAY EXAM OF ANKLE: CPT | Mod: RT

## 2023-11-03 RX ORDER — OXYCODONE HYDROCHLORIDE 5 MG/1
5 TABLET ORAL ONCE
Status: COMPLETED | OUTPATIENT
Start: 2023-11-03 | End: 2023-11-03

## 2023-11-03 RX ORDER — HYDROCODONE BITARTRATE AND ACETAMINOPHEN 5; 325 MG/1; MG/1
1 TABLET ORAL EVERY 8 HOURS PRN
Qty: 6 TABLET | Refills: 0 | Status: SHIPPED | OUTPATIENT
Start: 2023-11-03 | End: 2024-11-03

## 2023-11-03 RX ORDER — IBUPROFEN 600 MG/1
600 TABLET ORAL ONCE
Status: COMPLETED | OUTPATIENT
Start: 2023-11-03 | End: 2023-11-03

## 2023-11-03 RX ORDER — ACETAMINOPHEN 500 MG
1000 TABLET ORAL ONCE
Status: COMPLETED | OUTPATIENT
Start: 2023-11-03 | End: 2023-11-03

## 2023-11-03 RX ADMIN — IBUPROFEN 600 MG: 600 TABLET, FILM COATED ORAL at 20:27

## 2023-11-03 RX ADMIN — OXYCODONE HYDROCHLORIDE 5 MG: 5 TABLET ORAL at 21:56

## 2023-11-03 RX ADMIN — ACETAMINOPHEN 1000 MG: 500 TABLET, FILM COATED ORAL at 20:27

## 2023-11-03 NOTE — Clinical Note
Carmelita Rendon was seen and treated in our emergency department on 11/3/2023.  She may return to work on 11/10/2023.       If you have any questions or concerns, please don't hesitate to call.      Rena Lock M.D.

## 2023-11-04 NOTE — ED NOTES
Provided patient with discharge instructions. Patient Axo4 and GCS 15. Patient vitals are within defined limits. Patient is in no apparent distress and is respirating evenly and equally bilaterally with adequate chest rise and fall. Patient educated on safe use of narcotics and signed acknowledgement form. Patient verbalized understanding of follow up instructions with Orthopaedics. Patient ambulated out of ED with steady gait with brother to provide ride home.

## 2023-11-04 NOTE — ED TRIAGE NOTES
"Chief Complaint   Patient presents with    Ankle Pain     The pt reports falling down 2 stairs and landed into grass. The pt landed on right ankle and then onto side. The pt denies head strike. Right ankle is swollen. Positive csm in the right foot.        Pt wheelchair to triage. Pt A&Ox4, for the above complaint.     Pt to lobby . Pt educated on alerting staff in changes to condition. Pt verbalized understanding.     BP (!) 132/99   Pulse 85   Temp 36.3 °C (97.4 °F) (Temporal)   Resp 18   Ht 1.626 m (5' 4\")   Wt 119 kg (261 lb 7.5 oz)   SpO2 98%   BMI 44.88 kg/m²     "

## 2023-11-04 NOTE — ED NOTES
Provided patient with crutches and placed a walking boot on patients right foot. CMS intact prior to and post application.

## 2023-11-04 NOTE — ED PROVIDER NOTES
ED Provider Note    CHIEF COMPLAINT  Chief Complaint   Patient presents with    Ankle Pain     The pt reports falling down 2 stairs and landed into grass. The pt landed on right ankle and then onto side. The pt denies head strike. Right ankle is swollen. Positive csm in the right foot.        EXTERNAL RECORDS REVIEWED  Other Non-contributory    HPI/ROS  LIMITATION TO HISTORY   Select: : None  OUTSIDE HISTORIAN(S):  None    Carmelita Rendon is a 33 y.o. female who presents for evaluation of right ankle pain.  She was walking down the stairs when she tripped over her slipper and rolled her ankle.  She did not hit her head or lose consciousness.  She is now only having pain in her ankle.  She has found it difficult to walk on the ankle since then.  She has no knee pain or foot pain.  She does not take anything for her pain.  She has no numbness, tingling, weakness.    PAST MEDICAL HISTORY   has a past medical history of Acid reflux and PCOS (polycystic ovarian syndrome).    SURGICAL HISTORY  patient denies any surgical history    FAMILY HISTORY  Family History   Problem Relation Age of Onset    Diabetes Mother     Lung Disease Mother         emphysema     Hypertension Father     Cancer Neg Hx     Heart Disease Neg Hx        SOCIAL HISTORY  Social History     Tobacco Use    Smoking status: Every Day     Current packs/day: 0.50     Average packs/day: 0.5 packs/day for 10.0 years (5.0 ttl pk-yrs)     Types: Cigarettes    Smokeless tobacco: Never   Vaping Use    Vaping Use: Never used   Substance and Sexual Activity    Alcohol use: No    Drug use: No    Sexual activity: Yes     Partners: Male     Birth control/protection: None       CURRENT MEDICATIONS  Home Medications       Reviewed by Mal Wright R.N. (Registered Nurse) on 11/03/23 at [facility]  Med List Status: Partial     Medication Last Dose Status   albuterol 108 (90 Base) MCG/ACT Aero Soln inhalation aerosol  Active   varenicline (CHANTIX) 1 MG tablet  Active          "           ALLERGIES  No Known Allergies    PHYSICAL EXAM  VITAL SIGNS: BP (!) 132/99   Pulse 85   Temp 36.3 °C (97.4 °F) (Temporal)   Resp 18   Ht 1.626 m (5' 4\")   Wt 119 kg (261 lb 7.5 oz)   SpO2 98%   BMI 44.88 kg/m²      Constitutional: Well developed, Well nourished, No acute respiratory distress, Non-toxic appearance.   HENT: Normocephalic, Atraumatic, Bilateral external ears normal, Oropharynx clear, mucous membranes are moist.  Eyes: Conjunctiva normal, No discharge. No icterus.  Neck: Normal range of motion. Supple.  Lymphatic: No cervical lymphadenopathy noted.   Cardiovascular: Normal heart rate, Normal rhythm, No murmurs, No rubs, No gallops.   Thorax & Lungs: Clear to auscultation bilaterally, No respiratory distress, No wheezing.  Abdomen: Soft nontender normal bowel sounds no rebound masses or peritoneal signs  Skin: Warm, Dry, No erythema, No rash.  No open wounds or ecchymosis over ankle  Extremities: Intact distal pulses, able to range right ankle but with pain, tenderness to lateral malleolus, no medial malleolus tenderness to palpation, no tenderness to base of fifth metatarsal, no proximal fibular tenderness, no knee tenderness  Musculoskeletal: Good range of motion in all major joints. Normal gait.  Neurologic: Alert & oriented x 3. No focal deficits noted.   Psych: Alert normal affect       DIAGNOSTIC STUDIES / PROCEDURES      RADIOLOGY  I have independently interpreted the diagnostic imaging associated with this visit and am waiting the final reading from the radiologist.   My preliminary interpretation is as follows: +avulsion fracture.  Radiologist interpretation:   DX-ANKLE 3+ VIEWS RIGHT   Final Result      Minimally displaced right lateral malleoli avulsion fracture            COURSE & MEDICAL DECISION MAKING    ED Observation Status? No; Patient does not meet criteria for ED Observation.     INITIAL ASSESSMENT, COURSE AND PLAN  Care Narrative: This is a 33-year-old female who " presents for evaluation of right ankle pain after she tripped over her slipper and injured her ankle.  On arrival her vital signs are stable.  The right lower extremity is neurovascular intact.  She has tenderness over lateral malleolus of the right ankle but there is no open wounds.  She has no base of the fifth metatarsal tenderness to palpation and no tenderness to palpation to her foot.  There is no proximal fibular tenderness and otherwise no evidence of injury on exam.  She not hit her head or lose consciousness.    X-ray of the ankle was obtained and does show minimally displaced right lateral malleoli avulsion fracture.  Patient will be placed in a boot and given crutches.  Discussed nonweightbearing until she follows up with orthopedics.  She is given Tylenol and Motrin for pain but continued to have pain therefore was given oxycodone.  I will send her home with a short course of Norco.  She is advised to not drink alcohol or drive while taking this medications.  I have placed a referral for orthopedics, Dr. Figueroa.  Patient was given follow-up information for them and will call Monday to schedule follow-up.  Strict ER return precautions were discussed.  Patient is agreeable to discharge plan with no further questions.    In prescribing controlled substances to this patient, I certify that I have obtained and reviewed the medical history of Carmelita Rendon. I have also made a good bong effort to obtain applicable records from other providers who have treated the patient and records did not demonstrate any increased risk of substance abuse that would prevent me from prescribing controlled substances.     I have conducted a physical exam and documented it. I have reviewed Ms. Rendon’s prescription history as maintained by the [place] Prescription Monitoring Program.     I have assessed the patient’s risk for abuse, dependency, and addiction using the validated Opioid Risk Tool available at  https://www.mdcalc.com/rnlnnv-pjpt-tzmo-ort-narcotic-abuse.     Given the above, I believe the benefits of controlled substance therapy outweigh the risks. The reasons for prescribing controlled substances include non-narcotic, oral analgesic alternatives have been inadequate for pain control. Accordingly, I have discussed the risk and benefits, treatment plan, and alternative therapies with the patient.             DISPOSITION AND DISCUSSIONS  I have discussed management of the patient with the following physicians and [employer]:  None    Discussion of management with other QHP or appropriate source(s):  None      Escalation of care considered, and ultimately not performed:None    Barriers to care at this time, including but not limited to:  None .     Decision tools and prescription drugs considered including, but not limited to: Pain Medications Norco as pain is uncontrolled despite receiving Tylenol and Motrin and patient has fracture seen on x-ray .    Patient discharged home in stable condition with instructions to follow-up with orthopedics.  Strict ER return precautions were discussed.  Patient is agreeable to discharge plan with no further questions.    FINAL DIAGNOSIS  1. Closed avulsion fracture of lateral malleolus of right fibula, initial encounter           Electronically signed by: Rena Lock M.D., 11/3/2023 8:15 PM

## 2023-11-04 NOTE — DISCHARGE INSTRUCTIONS
You were seen in the emergency room for evaluation of ankle pain.  You are found to have a small avulsion fracture of the side of her ankle.  Please take Tylenol and Motrin for your pain.  I have prescribed you Norco for severe pain.  Do not drink alcohol or drive a car while taking this medicine.    Please use crutches and boot until you follow-up with orthopedics.  I have placed a referral to orthopedics so call their office on Monday to be seen.  Return emergency room for any worsening pain or any other concerns.    DX-ANKLE 3+ VIEWS RIGHT   Final Result      Minimally displaced right lateral malleoli avulsion fracture

## 2024-12-17 NOTE — PROGRESS NOTES
"This evaluation was conducted via Zoom using secure and encrypted videoconferencing technology. The patient was in their home in the [facility].    The patient's identity was confirmed and verbal consent was obtained for this virtual visit.     [facility]  Psychotherapy Consult    Patient Name: Carmelita Rendon  Patient MRN: [id number]  Today's Date:  23    Type of session: intake assessment  Session start time: 3  Session stop time: 3:45  Length of time spent face to face with patient: 45  Persons in attendance: patient     Diagnoses:   1. Depressed mood       Pt. Is seeking help for depression that she has struggled with in the last two years.  She feels napoles, sad, isolates, no motivation.      Feels some of it started related to relationship turmoil and also grief.  Her sister  3.5 years ago, they were very close, and she is tearful when discussing it.  Reports thinking about her sister every day.  Lost both of her parents, here father when she was 14 and her mother when she was 18.  Reports drinking to help with her grief.      Relationship has been on and off for 7 years, feels good about how things are now.    States she has had SI with no intent \"I need to experience my life, my mansoor\".  No DAUNE.  Used to drink, stopped completely years ago, related to her sister's intervention.  Smokes cigarettes.    Working now, in security.  Likes it.  Secure and safe housing as well.    Dreams include being a cook and having a housekeeping service.  Wants a baby in the future, a family.    Had counseling when in [place], focused on grief, states it was helpful.    Eager to start counseling.  Accepted referrals to [facility], [facility], and [facility].  And can f/u with this writer in the future for support/consultation.          Harriet Carr, Ph.D.         "
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 12/04 HIDA scan negative -likely related to peg and fluconazole use-will hold - DILI, hepatology with recs to discontinue amoxicillin and bactrim-dc on 12/06, will start atovaquone 1500 mg daily-12/06  -worsening, DB 0.7 (on admission)->9 (12/06)->16 (12/13) hepatology consulted, workup for viral etiologies and autoimmune negative.   -12/06: dc amoxicillin and bactrim-> started mepron for prophylaxis considering concern for DILI  12/09: continue L carnitine 1 gram TID, ursodiol 500 mg BID, and B complex in Nephro vit  (- present) considering likely peg   12/8- T.bili - 11.4,  CT A/P - Interval decreased hepatic attenuation/enhancement with marked heterogeneity of parenchyma and patent portal/hepatic veins. Differential includes hepatocellular injury, congestion, and hypoperfusion.  12/10  MRI abd No portal venous thrombosis. Diffuse marked hepatic steatosis  12/14 informed Dr Galdamez, hepatologist fellow re bili 17.6/>10, re continue supportive care; monitor transaminitis, improving  12/15 total bili 19.5 today
IV pump